# Patient Record
Sex: FEMALE | Race: BLACK OR AFRICAN AMERICAN
[De-identification: names, ages, dates, MRNs, and addresses within clinical notes are randomized per-mention and may not be internally consistent; named-entity substitution may affect disease eponyms.]

---

## 2018-06-04 ENCOUNTER — HOSPITAL ENCOUNTER (EMERGENCY)
Dept: HOSPITAL 19 - COL.ER | Age: 23
Discharge: HOME | End: 2018-06-04
Payer: SELF-PAY

## 2018-06-04 VITALS — HEIGHT: 60 IN | BODY MASS INDEX: 28.13 KG/M2 | WEIGHT: 143.3 LBS

## 2018-06-04 VITALS — SYSTOLIC BLOOD PRESSURE: 118 MMHG | TEMPERATURE: 98 F | DIASTOLIC BLOOD PRESSURE: 67 MMHG

## 2018-06-04 VITALS — HEART RATE: 75 BPM

## 2018-06-04 DIAGNOSIS — H16.001: Primary | ICD-10-CM

## 2020-06-19 ENCOUNTER — HOSPITAL ENCOUNTER (EMERGENCY)
Dept: HOSPITAL 61 - ER | Age: 25
Discharge: HOME | End: 2020-06-19
Payer: COMMERCIAL

## 2020-06-19 VITALS — HEIGHT: 60 IN | WEIGHT: 158.73 LBS | BODY MASS INDEX: 31.16 KG/M2

## 2020-06-19 VITALS — DIASTOLIC BLOOD PRESSURE: 72 MMHG | SYSTOLIC BLOOD PRESSURE: 126 MMHG

## 2020-06-19 DIAGNOSIS — M25.511: ICD-10-CM

## 2020-06-19 DIAGNOSIS — J30.2: ICD-10-CM

## 2020-06-19 DIAGNOSIS — S16.1XXA: Primary | ICD-10-CM
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
## 2020-06-19 DIAGNOSIS — Z98.890: ICD-10-CM
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
## 2020-06-19 DIAGNOSIS — Z90.89: ICD-10-CM

## 2020-06-19 DIAGNOSIS — M25.512: ICD-10-CM

## 2020-06-19 DIAGNOSIS — G43.909: ICD-10-CM
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
## 2020-06-19 PROCEDURE — 99284 EMERGENCY DEPT VISIT MOD MDM: CPT

## 2020-06-19 PROCEDURE — 72125 CT NECK SPINE W/O DYE: CPT

## 2020-06-19 PROCEDURE — 81025 URINE PREGNANCY TEST: CPT

## 2020-06-19 PROCEDURE — 96372 THER/PROPH/DIAG INJ SC/IM: CPT
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
